# Patient Record
Sex: FEMALE | Race: OTHER | NOT HISPANIC OR LATINO | URBAN - METROPOLITAN AREA
[De-identification: names, ages, dates, MRNs, and addresses within clinical notes are randomized per-mention and may not be internally consistent; named-entity substitution may affect disease eponyms.]

---
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
## 2021-01-08 PROBLEM — E03.9 HYPOTHYROIDISM, UNSPECIFIED: Chronic | Status: ACTIVE | Noted: 2019-08-03

## 2021-01-11 ENCOUNTER — OUTPATIENT (OUTPATIENT)
Dept: OUTPATIENT SERVICES | Facility: HOSPITAL | Age: 21
LOS: 1 days | Discharge: HOME | End: 2021-01-11
Payer: COMMERCIAL

## 2021-01-11 DIAGNOSIS — N64.4 MASTODYNIA: ICD-10-CM

## 2021-01-11 PROCEDURE — 76641 ULTRASOUND BREAST COMPLETE: CPT | Mod: 26,RT

## 2021-07-07 ENCOUNTER — EMERGENCY (EMERGENCY)
Facility: HOSPITAL | Age: 21
LOS: 0 days | Discharge: HOME | End: 2021-07-07
Attending: STUDENT IN AN ORGANIZED HEALTH CARE EDUCATION/TRAINING PROGRAM | Admitting: STUDENT IN AN ORGANIZED HEALTH CARE EDUCATION/TRAINING PROGRAM
Payer: MEDICAID

## 2021-07-07 VITALS
TEMPERATURE: 99 F | SYSTOLIC BLOOD PRESSURE: 111 MMHG | HEART RATE: 83 BPM | DIASTOLIC BLOOD PRESSURE: 65 MMHG | RESPIRATION RATE: 18 BRPM | OXYGEN SATURATION: 97 %

## 2021-07-07 VITALS
DIASTOLIC BLOOD PRESSURE: 68 MMHG | SYSTOLIC BLOOD PRESSURE: 134 MMHG | OXYGEN SATURATION: 100 % | RESPIRATION RATE: 18 BRPM | WEIGHT: 130.07 LBS | TEMPERATURE: 99 F | HEART RATE: 103 BPM

## 2021-07-07 DIAGNOSIS — M54.5 LOW BACK PAIN: ICD-10-CM

## 2021-07-07 DIAGNOSIS — E03.9 HYPOTHYROIDISM, UNSPECIFIED: ICD-10-CM

## 2021-07-07 DIAGNOSIS — R10.2 PELVIC AND PERINEAL PAIN: ICD-10-CM

## 2021-07-07 DIAGNOSIS — Z87.42 PERSONAL HISTORY OF OTHER DISEASES OF THE FEMALE GENITAL TRACT: ICD-10-CM

## 2021-07-07 DIAGNOSIS — R10.30 LOWER ABDOMINAL PAIN, UNSPECIFIED: ICD-10-CM

## 2021-07-07 DIAGNOSIS — N83.201 UNSPECIFIED OVARIAN CYST, RIGHT SIDE: ICD-10-CM

## 2021-07-07 DIAGNOSIS — Z87.440 PERSONAL HISTORY OF URINARY (TRACT) INFECTIONS: ICD-10-CM

## 2021-07-07 LAB
APPEARANCE UR: CLEAR — SIGNIFICANT CHANGE UP
BILIRUB UR-MCNC: NEGATIVE — SIGNIFICANT CHANGE UP
C TRACH RRNA SPEC QL NAA+PROBE: SIGNIFICANT CHANGE UP
COLOR SPEC: YELLOW — SIGNIFICANT CHANGE UP
DIFF PNL FLD: NEGATIVE — SIGNIFICANT CHANGE UP
GLUCOSE UR QL: NEGATIVE — SIGNIFICANT CHANGE UP
KETONES UR-MCNC: NEGATIVE — SIGNIFICANT CHANGE UP
LEUKOCYTE ESTERASE UR-ACNC: NEGATIVE — SIGNIFICANT CHANGE UP
N GONORRHOEA RRNA SPEC QL NAA+PROBE: SIGNIFICANT CHANGE UP
NITRITE UR-MCNC: NEGATIVE — SIGNIFICANT CHANGE UP
PH UR: 6.5 — SIGNIFICANT CHANGE UP (ref 5–8)
PROT UR-MCNC: SIGNIFICANT CHANGE UP
SP GR SPEC: 1.03 — HIGH (ref 1.01–1.03)
SPECIMEN SOURCE: SIGNIFICANT CHANGE UP
UROBILINOGEN FLD QL: SIGNIFICANT CHANGE UP

## 2021-07-07 PROCEDURE — 99285 EMERGENCY DEPT VISIT HI MDM: CPT

## 2021-07-07 PROCEDURE — 76830 TRANSVAGINAL US NON-OB: CPT | Mod: 26

## 2021-07-07 RX ORDER — IBUPROFEN 200 MG
400 TABLET ORAL ONCE
Refills: 0 | Status: COMPLETED | OUTPATIENT
Start: 2021-07-07 | End: 2021-07-07

## 2021-07-07 RX ADMIN — Medication 400 MILLIGRAM(S): at 11:24

## 2021-07-07 NOTE — ED PROVIDER NOTE - PHYSICAL EXAMINATION
General: Awake, alert, NAD.  HEENT: NCAT, PERRL, oropharynx without erythema or exudates, moist mucous membranes.  RESP: CTAB, no increased work of breathing.  CVS: S1, S2, no murmurs,  cap refill <2 sec, 2+ peripheral pulses.  ABD: (+) BS, soft, non-distended, TTP in RLQ and LLQ, no masses.  MSK: FROM in all extremities, no tenderness, no deformities.  NEURO: CNs II-XII grossly intact, motor 5/5, normal tone.  SKIN: Warm, dry, well-perfused, no rashes. General: Awake, alert, NAD.  HEENT: NCAT, PERRL, oropharynx without erythema or exudates, moist mucous membranes.  RESP: CTAB, no increased work of breathing.  CVS: S1, S2, no murmurs,  cap refill <2 sec, 2+ peripheral pulses.  ABD: (+) BS, soft, non-distended, TTP in RLQ and LLQ, no masses.  : Cervix with normal discharge. No cervical motion tenderness.  MSK: FROM in all extremities, TTP to lower back, no deformities.  NEURO: CNs II-XII grossly intact, motor 5/5, normal tone.  SKIN: Warm, dry, well-perfused, no rashes. General: Awake, alert, NAD.  HEENT: NCAT, PERRL, oropharynx without erythema or exudates, moist mucous membranes.  RESP: CTAB, no increased work of breathing.  CVS: S1, S2, no murmurs,  cap refill <2 sec, 2+ peripheral pulses.  ABD: (+) BS, soft, non-distended, TTP in RLQ and LLQ, no masses.  : Cervix with normal discharge. No cervical motion or adnexal tenderness.  MSK: FROM in all extremities, TTP to lower back, no deformities.  NEURO: CNs II-XII grossly intact, motor 5/5, normal tone.  SKIN: Warm, dry, well-perfused, no rashes.

## 2021-07-07 NOTE — ED PROVIDER NOTE - NSFOLLOWUPINSTRUCTIONS_ED_ALL_ED_FT
Ovarian Cyst    WHAT YOU NEED TO KNOW:    An ovarian cyst is a fluid-filled sac that grows in or on an ovary. You have 2 ovaries, 1 on each side of your uterus. They are small, about the shape of an almond. Ovarian cysts are common in women who have regular monthly cycles. During your monthly cycle, eggs are released from the ovaries. The cyst usually contains fluid but may sometimes have blood or tissue in it. Most ovarian cysts are harmless and go away without treatment in a few months. Some cysts can grow large, cause pain, or break open.   Female Reproductive System     DISCHARGE INSTRUCTIONS:    Call your local emergency number (911 in the ) if:   • You have severe pain with fever and vomiting.    • You have sudden, severe abdominal pain.    • You are too weak, faint, or dizzy to stand up.    • You are breathing very quickly.    Call your doctor if:   • Your periods are early, late, or more painful than usual.    • You have questions or concerns about your condition or care.    Medicines: You may need any of the following:  • Birth control pills may help control your monthly cycle, prevent cysts, or cause them to shrink.    • Acetaminophen decreases pain and fever. It is available without a doctor's order. Ask how much to take and how often to take it. Follow directions. Read the labels of all other medicines you are using to see if they also contain acetaminophen, or ask your doctor or pharmacist. Acetaminophen can cause liver damage if not taken correctly. Do not use more than 4 grams (4,000 milligrams) total of acetaminophen in one day.     • NSAIDs, such as ibuprofen, help decrease swelling, pain, and fever. This medicine is available with or without a doctor's order. NSAIDs can cause stomach bleeding or kidney problems in certain people. If you take blood thinner medicine, always ask your healthcare provider if NSAIDs are safe for you. Always read the medicine label and follow directions.    • Prescription pain medicine may be given. Ask your healthcare provider how to take this medicine safely. Some prescription pain medicines contain acetaminophen. Do not take other medicines that contain acetaminophen without talking to your healthcare provider. Too much acetaminophen may cause liver damage. Prescription pain medicine may cause constipation. Ask your healthcare provider how to prevent or treat constipation.     • Take your medicine as directed. Contact your healthcare provider if you think your medicine is not helping or if you have side effects. Tell him or her if you are allergic to any medicine. Keep a list of the medicines, vitamins, and herbs you take. Include the amounts, and when and why you take them. Bring the list or the pill bottles to follow-up visits. Carry your medicine list with you in case of an emergency.    Manage ovarian cysts: You can manage a current cyst and help healthcare providers find future cysts early.    • Apply heat to decrease pain and cramping from a cyst. Sit in a warm bath, or place a heating pad (turned on low) on your abdomen. Do this for 15 to 20 minutes every hour for comfort.    • Get regular pelvic exams or Pap smears. This will help providers find any new ovarian cysts. Tell your healthcare provider about any unusual changes in your monthly cycle.    Follow up with your doctor or gynecologist as directed: Write down your questions so you remember to ask them during your visits. Take Tylenol/Motrin as needed for pain. Follow up with OB/GYN in 4-6 days.    Ovarian Cyst    WHAT YOU NEED TO KNOW:    An ovarian cyst is a fluid-filled sac that grows in or on an ovary. You have 2 ovaries, 1 on each side of your uterus. They are small, about the shape of an almond. Ovarian cysts are common in women who have regular monthly cycles. During your monthly cycle, eggs are released from the ovaries. The cyst usually contains fluid but may sometimes have blood or tissue in it. Most ovarian cysts are harmless and go away without treatment in a few months. Some cysts can grow large, cause pain, or break open.   Female Reproductive System     DISCHARGE INSTRUCTIONS:    Call your local emergency number (911 in the [place]) if:   • You have severe pain with fever and vomiting.    • You have sudden, severe abdominal pain.    • You are too weak, faint, or dizzy to stand up.    • You are breathing very quickly.    Call your doctor if:   • Your periods are early, late, or more painful than usual.    • You have questions or concerns about your condition or care.    Medicines: You may need any of the following:  • Birth control pills may help control your monthly cycle, prevent cysts, or cause them to shrink.    • Acetaminophen decreases pain and fever. It is available without a doctor's order. Ask how much to take and how often to take it. Follow directions. Read the labels of all other medicines you are using to see if they also contain acetaminophen, or ask your doctor or pharmacist. Acetaminophen can cause liver damage if not taken correctly. Do not use more than 4 grams (4,000 milligrams) total of acetaminophen in one day.     • NSAIDs, such as ibuprofen, help decrease swelling, pain, and fever. This medicine is available with or without a doctor's order. NSAIDs can cause stomach bleeding or kidney problems in certain people. If you take blood thinner medicine, always ask your healthcare provider if NSAIDs are safe for you. Always read the medicine label and follow directions.    • Prescription pain medicine may be given. Ask your healthcare provider how to take this medicine safely. Some prescription pain medicines contain acetaminophen. Do not take other medicines that contain acetaminophen without talking to your healthcare provider. Too much acetaminophen may cause liver damage. Prescription pain medicine may cause constipation. Ask your healthcare provider how to prevent or treat constipation.     • Take your medicine as directed. Contact your healthcare provider if you think your medicine is not helping or if you have side effects. Tell him or her if you are allergic to any medicine. Keep a list of the medicines, vitamins, and herbs you take. Include the amounts, and when and why you take them. Bring the list or the pill bottles to follow-up visits. Carry your medicine list with you in case of an emergency.    Manage ovarian cysts: You can manage a current cyst and help healthcare providers find future cysts early.    • Apply heat to decrease pain and cramping from a cyst. Sit in a warm bath, or place a heating pad (turned on low) on your abdomen. Do this for 15 to 20 minutes every hour for comfort.    • Get regular pelvic exams or Pap smears. This will help providers find any new ovarian cysts. Tell your healthcare provider about any unusual changes in your monthly cycle.    Follow up with your doctor or gynecologist as directed: Write down your questions so you remember to ask them during your visits.

## 2021-07-07 NOTE — ED PROVIDER NOTE - ATTENDING CONTRIBUTION TO CARE
20 y.o. F with history of hypothyroidism, frequent UTIs, genital HSV presenting with abdominal/back pain x1 day. pain to lower abd and back. pain after intercourse. pain described as crampy, moderate intensity. pt took motrin last night w/o relief. lmp x3 weeks. pain worse than typical menstrual cycle cramp. no n/v/vaginal bleeding or discharge.     vss  gen- NAD, aaox3  card-rrr  lungs-ctab, no wheezing or rhonchi  abd-RLQ/LLQ tenderness, no guarding or rebound  GYN-   neuro- full str/sensation, cn ii-xii grossly intact, normal coordination 20 y.o. F with history of hypothyroidism, frequent UTIs, genital HSV presenting with abdominal/back pain x1 day. pain to lower abd and back. pain after intercourse. pain described as crampy, moderate intensity. pt took motrin last night w/o relief. lmp x3 weeks. pain worse than typical menstrual cycle cramp. no n/v/vaginal bleeding or discharge.     vss  gen- NAD, aaox3  card-rrr  lungs-ctab, no wheezing or rhonchi  abd-RLQ/LLQ tenderness, no guarding or rebound  GYN- penidng   neuro- full str/sensation, cn ii-xii grossly intact, normal coordination    will start w/ ua, upreg, pelvic sono, serial exam, supportive care

## 2021-07-07 NOTE — ED PROVIDER NOTE - PROGRESS NOTE DETAILS
pt endorsed to Dr. Brian- pending pelvic exam, pelvic sono, reassess, dispo Dr. Brian: Sign out received by Dr. Leroy. Will follow US. Dr. Brian: Pelvic exam performed alongside, Dr. Tucker. No erythema. Normal vaginal exam with no CMT or adnexal tenderness. Patient reports pain improved s/p Motrin. Transvaginal US showed hemorrhagic fluid within the pelvis with a right ovarian 2.9 cm hemorrhagic cyst; echogenic and possibly calcified regions within the right ovary could suggest a teratoma, suggest outpatient MRI evaluation. Will d/c patient with OB follow up for management of cyst and outpatient MRI.

## 2021-07-07 NOTE — ED PEDIATRIC TRIAGE NOTE - CHIEF COMPLAINT QUOTE
pt presents with c/o lower abd pain. Pt denies n/v/d. pt states she is due for her menses next week. pt presents with c/o lower abd pain. Pt denies n/v/d. pt states she is due for her menses next week. Pt states she is fully vaccinated.

## 2021-07-07 NOTE — ED PEDIATRIC NURSE NOTE - CHIEF COMPLAINT QUOTE
pt presents with c/o lower abd pain. Pt denies n/v/d. pt states she is due for her menses next week. Pt states she is fully vaccinated.

## 2021-07-07 NOTE — ED PEDIATRIC NURSE NOTE - LOW RISK FALLS INTERVENTIONS (SCORE 7-11)
Orientation to room/Bed in low position, brakes on/Use of non-skid footwear for ambulating patients, use of appropriate size clothing to prevent risk of tripping/Assess eliminations need, assist as needed/Call light is within reach, educate patient/family on its functionality/Environment clear of unused equipment, furniture's in place, clear of hazards/Assess for adequate lighting, leave nightlight on/Document fall prevention teaching and include in plan of care

## 2021-07-07 NOTE — ED PROVIDER NOTE - CLINICAL SUMMARY MEDICAL DECISION MAKING FREE TEXT BOX
20 y.o. F with history of hypothyroidism, frequent UTIs, genital HSV presenting with abdominal/back pain x1 day. pain to lower abd and back. pain after intercourse. pain described as crampy, moderate intensity. pt took motrin last night w/o relief. lmp x3 weeks. VS reviewed. Labs imaging obtained and reviewed. US demonstrated Hemorrhagic fluid within the pelvis with a right ovarian 2.9 cm hemorrhagic cyst. Pain may be related to recently ruptured cyst given surrounding complex fluid. Echogenic and possibly calcified regions within the right ovary could suggest a teratoma. Outpatient MRI evaluation could be obtained on a nonemergent basis. Patient felt better, informed of all results aware to follow up with OBGYN and outpatient mri. Patient a spoken to in detail about results  All questions addressed.  Results of ED work up discussed and patient given a copy of the results. Patient has proper follow up. Return precautions given.

## 2021-07-07 NOTE — ED PROVIDER NOTE - PATIENT PORTAL LINK FT
You can access the FollowMyHealth Patient Portal offered by [facility] by registering at the following website: [email or web address] By joining [employer]’s FollowMyHealth portal, you will also be able to view your health information using other applications (apps) compatible with our system.
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
## 2022-01-16 ENCOUNTER — TRANSCRIPTION ENCOUNTER (OUTPATIENT)
Age: 22
End: 2022-01-16

## 2022-11-13 ENCOUNTER — EMERGENCY (EMERGENCY)
Facility: HOSPITAL | Age: 22
LOS: 0 days | Discharge: HOME | End: 2022-11-13
Attending: EMERGENCY MEDICINE | Admitting: STUDENT IN AN ORGANIZED HEALTH CARE EDUCATION/TRAINING PROGRAM

## 2022-11-13 VITALS
DIASTOLIC BLOOD PRESSURE: 84 MMHG | RESPIRATION RATE: 18 BRPM | TEMPERATURE: 98 F | OXYGEN SATURATION: 99 % | SYSTOLIC BLOOD PRESSURE: 130 MMHG | WEIGHT: 119.93 LBS | HEART RATE: 120 BPM

## 2022-11-13 VITALS — HEART RATE: 88 BPM

## 2022-11-13 DIAGNOSIS — M25.571 PAIN IN RIGHT ANKLE AND JOINTS OF RIGHT FOOT: ICD-10-CM

## 2022-11-13 DIAGNOSIS — E03.9 HYPOTHYROIDISM, UNSPECIFIED: ICD-10-CM
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
## 2022-11-13 PROCEDURE — 99283 EMERGENCY DEPT VISIT LOW MDM: CPT | Mod: 25

## 2022-11-13 PROCEDURE — 29515 APPLICATION SHORT LEG SPLINT: CPT

## 2022-11-13 PROCEDURE — 73610 X-RAY EXAM OF ANKLE: CPT | Mod: 26,RT

## 2022-11-13 NOTE — ED PROVIDER NOTE - PATIENT PORTAL LINK FT
You can access the FollowMyHealth Patient Portal offered by [facility] by registering at the following website: [email or web address]. By joining [employer]’s FollowMyHealth portal, you will also be able to view your health information using other applications (apps) compatible with our system.

## 2022-11-13 NOTE — ED PROVIDER NOTE - CARE PROVIDER_API CALL
Justin Correa)  Orthopaedic Surgery  [address] [place] [address]  Phone: [phone]  Fax: [phone]  Follow Up Time: 1-3 Days

## 2022-11-13 NOTE — ED PROVIDER NOTE - NSFOLLOWUPINSTRUCTIONS_ED_ALL_ED_FT

## 2022-11-13 NOTE — ED PROVIDER NOTE - CARE PLAN
1 Principal Discharge DX:	Ankle pain  Assessment and plan of treatment:	Plan: R ankle xray, reassess.

## 2022-11-13 NOTE — ED PROVIDER NOTE - NS ED ROS FT
Review of Systems    Constitutional: (-) fever  Cardiovascular: (-) chest pain, (-) syncope  Respiratory: (-) cough, (-) shortness of breath  Gastrointestinal: (-) vomiting, (-) diarrhea, (-) abdominal pain  Musculoskeletal: (-) neck pain, (-) back pain, (+) R ankle pain  Integumentary: (-) rash, (-) edema  Neurological: (-) headache, (-) altered mental status    Except as documented in the HPI, all other systems are negative.

## 2022-11-13 NOTE — ED PROVIDER NOTE - PHYSICAL EXAMINATION
Vital Signs: I have reviewed the initial vital signs.  Constitutional: well-nourished, appears stated age, no acute distress.  HEENT: Airway patent, moist MM, EOMI, PERRLA.  CV: regular rate, regular rhythm, well-perfused extremities,   Lungs: Clear to ascultation bilaterally, no increased work of breathing.  ABD: Non-tender, Non-distended,   MSK: Neck supple, nontender, normal range of motion, pain on palpation of R medial ankle, pain with ambulation.   INTEG: Skin warm, dry, no rash.  NEURO: A&Ox3, moving all extremities, normal speech  PSYCH: Calm, cooperative, normal affect and interaction.

## 2022-11-13 NOTE — ED PROVIDER NOTE - CLINICAL SUMMARY MEDICAL DECISION MAKING FREE TEXT BOX
pt aware of xray, placed in splint, neurovascular intact, aware of proper use of crutches and using properly in ed, aware of signs and symptoms to return for, will follow up with ortho as discussed.

## 2022-11-13 NOTE — ED PROCEDURE NOTE - CPROC ED POST PROC CARE GUIDE1
Verbal/written post procedure instructions were given to patient/caregiver./Instructed patient/caregiver to follow-up with primary care physician./Instructed patient/caregiver regarding signs and symptoms of infection./Elevate the injured extremity as instructed./Keep the cast/splint/dressing clean and dry. Ambulatory
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
## 2022-11-13 NOTE — ED PROVIDER NOTE - PROGRESS NOTE DETAILS
ED Attending SUSANNAH Newton  Pt aware of xray, placed in splint, nv intact, aware of proper use of crutches and splint, aware of signs and symptoms to return for,  will follow up with ortho.

## 2023-08-24 NOTE — ED PROVIDER NOTE - CARE PROVIDER_API CALL
[facility]  [address] [place] [address]  Phone: [phone]  Fax: [phone]  Follow Up Time: 4-6 Days  
170.18

## 2023-10-04 NOTE — ED PROCEDURE NOTE - CPROC ED ANATOMIC LOCATION1
ankle High Dose Vitamin A Pregnancy And Lactation Text: High dose vitamin A therapy is contraindicated during pregnancy and breast feeding.

## 2023-12-28 NOTE — ED PROVIDER NOTE - OTHER FINDINGS
CXR showing mild b/l infiltrates   PSI/PORT Score 84 points Risk Class III  Will start Ceftriaxone 1g IV and Azithromycin 500 mg IV for now   F/U Strep  F/U Mycoplasma  F/U Legionella  Tylenol PRN  Wean of oxygen as tolerated   Incentive spirometer   Aspiration and fall precautions
QTc 469